# Patient Record
Sex: MALE | Employment: FULL TIME | URBAN - METROPOLITAN AREA
[De-identification: names, ages, dates, MRNs, and addresses within clinical notes are randomized per-mention and may not be internally consistent; named-entity substitution may affect disease eponyms.]

---

## 2019-04-10 ENCOUNTER — APPOINTMENT (OUTPATIENT)
Dept: CT IMAGING | Age: 39
End: 2019-04-10
Attending: EMERGENCY MEDICINE
Payer: COMMERCIAL

## 2019-04-10 ENCOUNTER — HOSPITAL ENCOUNTER (EMERGENCY)
Age: 39
Discharge: HOME | End: 2019-04-11
Attending: EMERGENCY MEDICINE
Payer: COMMERCIAL

## 2019-04-10 VITALS
SYSTOLIC BLOOD PRESSURE: 159 MMHG | OXYGEN SATURATION: 99 % | HEIGHT: 72 IN | BODY MASS INDEX: 42.66 KG/M2 | WEIGHT: 315 LBS | DIASTOLIC BLOOD PRESSURE: 78 MMHG | TEMPERATURE: 98 F | HEART RATE: 99 BPM | RESPIRATION RATE: 16 BRPM

## 2019-04-10 DIAGNOSIS — R51.9 ACUTE NONINTRACTABLE HEADACHE, UNSPECIFIED HEADACHE TYPE: Primary | ICD-10-CM

## 2019-04-10 LAB
ANION GAP SERPL CALC-SCNC: 6 MMOL/L (ref 3–18)
BASOPHILS # BLD: 0.1 K/UL (ref 0–0.1)
BASOPHILS NFR BLD: 1 % (ref 0–2)
BUN SERPL-MCNC: 21 MG/DL (ref 7–18)
BUN/CREAT SERPL: 18 (ref 12–20)
CALCIUM SERPL-MCNC: 8.6 MG/DL (ref 8.5–10.1)
CHLORIDE SERPL-SCNC: 104 MMOL/L (ref 100–108)
CO2 SERPL-SCNC: 28 MMOL/L (ref 21–32)
CREAT SERPL-MCNC: 1.2 MG/DL (ref 0.6–1.3)
DIFFERENTIAL METHOD BLD: ABNORMAL
EOSINOPHIL # BLD: 0.2 K/UL (ref 0–0.4)
EOSINOPHIL NFR BLD: 2 % (ref 0–5)
ERYTHROCYTE [DISTWIDTH] IN BLOOD BY AUTOMATED COUNT: 13.7 % (ref 11.6–14.5)
GLUCOSE SERPL-MCNC: 92 MG/DL (ref 74–99)
HCT VFR BLD AUTO: 47.9 % (ref 36–48)
HGB BLD-MCNC: 16.3 G/DL (ref 13–16)
LYMPHOCYTES # BLD: 3.7 K/UL (ref 0.9–3.6)
LYMPHOCYTES NFR BLD: 35 % (ref 21–52)
MAGNESIUM SERPL-MCNC: 2.3 MG/DL (ref 1.6–2.6)
MCH RBC QN AUTO: 29.2 PG (ref 24–34)
MCHC RBC AUTO-ENTMCNC: 34 G/DL (ref 31–37)
MCV RBC AUTO: 85.8 FL (ref 74–97)
MONOCYTES # BLD: 1 K/UL (ref 0.05–1.2)
MONOCYTES NFR BLD: 9 % (ref 3–10)
NEUTS SEG # BLD: 5.6 K/UL (ref 1.8–8)
NEUTS SEG NFR BLD: 53 % (ref 40–73)
PLATELET # BLD AUTO: 242 K/UL (ref 135–420)
PMV BLD AUTO: 10 FL (ref 9.2–11.8)
POTASSIUM SERPL-SCNC: 3.9 MMOL/L (ref 3.5–5.5)
RBC # BLD AUTO: 5.58 M/UL (ref 4.7–5.5)
SODIUM SERPL-SCNC: 138 MMOL/L (ref 136–145)
WBC # BLD AUTO: 10.4 K/UL (ref 4.6–13.2)

## 2019-04-10 PROCEDURE — 96360 HYDRATION IV INFUSION INIT: CPT

## 2019-04-10 PROCEDURE — 99285 EMERGENCY DEPT VISIT HI MDM: CPT

## 2019-04-10 PROCEDURE — 85025 COMPLETE CBC W/AUTO DIFF WBC: CPT

## 2019-04-10 PROCEDURE — 74011250636 HC RX REV CODE- 250/636: Performed by: EMERGENCY MEDICINE

## 2019-04-10 PROCEDURE — 70450 CT HEAD/BRAIN W/O DYE: CPT

## 2019-04-10 PROCEDURE — 93005 ELECTROCARDIOGRAM TRACING: CPT

## 2019-04-10 PROCEDURE — 96361 HYDRATE IV INFUSION ADD-ON: CPT

## 2019-04-10 PROCEDURE — 83735 ASSAY OF MAGNESIUM: CPT

## 2019-04-10 PROCEDURE — 80048 BASIC METABOLIC PNL TOTAL CA: CPT

## 2019-04-10 RX ORDER — TESTOSTERONE 50 MG/5G
5 GEL TRANSDERMAL DAILY
COMMUNITY
End: 2022-03-06

## 2019-04-10 RX ORDER — ACETAMINOPHEN 500 MG
1000 TABLET ORAL
Status: DISCONTINUED | OUTPATIENT
Start: 2019-04-10 | End: 2019-04-11 | Stop reason: HOSPADM

## 2019-04-10 RX ORDER — BUTALBITAL, ACETAMINOPHEN AND CAFFEINE 300; 40; 50 MG/1; MG/1; MG/1
1 CAPSULE ORAL
Qty: 20 CAP | Refills: 0 | Status: SHIPPED | OUTPATIENT
Start: 2019-04-10 | End: 2022-09-09

## 2019-04-10 RX ORDER — LISINOPRIL 40 MG/1
40 TABLET ORAL DAILY
COMMUNITY
End: 2022-09-09

## 2019-04-10 RX ORDER — HYDROCHLOROTHIAZIDE 12.5 MG/1
12.5 TABLET ORAL DAILY
COMMUNITY
End: 2022-09-09

## 2019-04-10 RX ORDER — DEXTROAMPHETAMINE SACCHARATE, AMPHETAMINE ASPARTATE, DEXTROAMPHETAMINE SULFATE AND AMPHETAMINE SULFATE 1.25; 1.25; 1.25; 1.25 MG/1; MG/1; MG/1; MG/1
5 TABLET ORAL
COMMUNITY
End: 2022-09-09

## 2019-04-10 RX ADMIN — SODIUM CHLORIDE 500 ML: 900 INJECTION, SOLUTION INTRAVENOUS at 21:38

## 2019-04-11 NOTE — DISCHARGE INSTRUCTIONS

## 2019-04-11 NOTE — ED TRIAGE NOTES
Presented to ED to be evaluated for reported visual field disturbances and dizziness with onset this p.m. Patient reports difficulty with peripheral fields. Patient denies any known injury and/or pain. Patient A&Ox 4 denies any additional complaints at this time. Sepsis Screening completed    (  )Patient meets SIRS criteria. (x  )Patient does not meet SIRS criteria.       SIRS Criteria is achieved when two or more of the following are present   Temperature < 96.8°F (36°C) or > 100.9°F (38.3°C)   Heart Rate > 90 beats per minute   Respiratory Rate > 20 breaths per minute   WBC count > 12,000 or <4,000 or > 10% bands

## 2019-05-01 NOTE — ED PROVIDER NOTES
EMERGENCY DEPARTMENT HISTORY AND PHYSICAL EXAM    Date: 4/10/2019  Patient Name: Rashida Davis    History of Presenting Illness     Chief Complaint   Patient presents with    Dizziness    Visual Field Change       History Provided By: Patient    Additional History (Context):   8:44 PM  Rashida Davis is a 45 y.o. male with PMHX of HTN, ADHD, PTSD, generalized anxiety disorder and medication for low testosterone who presents to the emergency department C/O visual field disturbances and dizziness, lightheadedness, not vertigo started a few hours before arrival. He reports peripheral blurriness in vision, but no scotoma or amaurosis. He has no HA, no syncope, no slurred speech, no arm or leg weakness. He denies CP/SOB, diaphoresis. Patient denies any known injury and/or pain. Patient A&Ox 4 denies any additional complaints at this time. PCP: Felix Harden MD    Current Outpatient Medications   Medication Sig Dispense Refill    lisinopril (PRINIVIL, ZESTRIL) 40 mg tablet Take 40 mg by mouth daily.  hydroCHLOROthiazide (HYDRODIURIL) 12.5 mg tablet Take 12.5 mg by mouth daily.  dextroamphetamine-amphetamine (ADDERALL) 5 mg tablet Take 5 mg by mouth.  testosterone (TESTIM) 50 mg/5 gram (1 %) gel 5 g by TransDERmal route daily. Indications: unknown dose. reports q 2 weeks.  butalbital-acetaminophen-caff (FIORICET) -40 mg per capsule Take 1 Cap by mouth every four (4) hours as needed for Pain. 20 Cap 0       Past History     Past Medical History:  History reviewed. No pertinent past medical history. Past Surgical History:  Past Surgical History:   Procedure Laterality Date    HX HEENT      HX ORTHOPAEDIC         Family History:  History reviewed. No pertinent family history.     Social History:  Social History     Tobacco Use    Smoking status: Never Smoker    Smokeless tobacco: Never Used   Substance Use Topics    Alcohol use: Never     Frequency: Never    Drug use: Never Allergies:  No Known Allergies      Review of Systems   Review of Systems   Constitutional: Positive for activity change and fatigue. HENT: Negative for congestion, ear discharge, ear pain, facial swelling, hearing loss, sinus pain and trouble swallowing. Eyes: Positive for visual disturbance. Negative for photophobia, pain, discharge, redness and itching. Respiratory: Negative for cough, chest tightness and shortness of breath. Cardiovascular: Negative for chest pain. Gastrointestinal: Negative for diarrhea, nausea and vomiting. Endocrine: Negative for cold intolerance and heat intolerance. Genitourinary: Negative for frequency, hematuria and urgency. Musculoskeletal: Negative for arthralgias and myalgias. Skin: Negative for color change. Neurological: Positive for dizziness and light-headedness. Negative for syncope, facial asymmetry, speech difficulty, numbness and headaches. No vertigo   Hematological: Does not bruise/bleed easily. All other systems reviewed and are negative. Physical Exam     Vitals:    04/10/19 2030   BP: 159/78   Pulse: 99   Resp: 16   Temp: 98 °F (36.7 °C)   SpO2: 99%   Weight: 144.7 kg (319 lb)   Height: 6' (1.829 m)     Physical Exam   Constitutional: He is oriented to person, place, and time. He appears well-developed and well-nourished. No distress. HENT:   Head: Normocephalic and atraumatic. Right Ear: External ear normal.   Left Ear: External ear normal.   Mouth/Throat: Oropharynx is clear and moist. No oropharyngeal exudate. Eyes: Pupils are equal, round, and reactive to light. Conjunctivae and EOM are normal. No scleral icterus. Right eye exhibits no nystagmus. Left eye exhibits no nystagmus. Fundoscopic exam:       The right eye shows no exudate, no hemorrhage and no papilledema. The left eye shows no exudate, no hemorrhage and no papilledema. Slit lamp exam:       The right eye shows no hyphema and no hypopyon.         The left eye shows no hyphema and no hypopyon. Visual fields full x 4 bilateral  No pallor   Neck: Normal range of motion. Neck supple. No JVD present. No tracheal deviation present. No thyromegaly present. Cardiovascular: Normal rate, regular rhythm and normal heart sounds. Pulmonary/Chest: Effort normal and breath sounds normal. No stridor. No respiratory distress. Abdominal: Soft. Bowel sounds are normal. He exhibits no distension. There is no tenderness. There is no rebound and no guarding. Musculoskeletal: Normal range of motion. He exhibits no edema or tenderness. No soft tissue injuries   Lymphadenopathy:     He has no cervical adenopathy. Neurological: He is alert and oriented to person, place, and time. He has normal strength and normal reflexes. No cranial nerve deficit. He displays a negative Romberg sign. Coordination and gait normal. GCS eye subscore is 4. GCS verbal subscore is 5. GCS motor subscore is 6.   no pronator drift, normal fine motor coordination, radial median ulnar nerves normal.   Skin: Skin is warm and dry. No rash noted. He is not diaphoretic. No erythema. Psychiatric: He has a normal mood and affect. His behavior is normal. Judgment and thought content normal.   Nursing note and vitals reviewed. Diagnostic Study Results     Labs -  Lab Results         Contains abnormal data CBC WITH AUTOMATED DIFF (Final result)    Component (Lab Inquiry)   Collection Time Result Time WBC RBC HGB HCT MCV MCH MCHC RDW PLT MPLV   04/10/19 20:20:00 04/10/19 20:51:40 10.4 5. 58High  16. 3High  47.9 85.8 29.2 34.0 13.7 242 10.0       Collection Time Result Time GRANS LYMPH MONOS EOS BASOS ABG ABL ABM BONNIE ABB   04/10/19 20:20:00 04/10/19 20:51:40 53 35 9 2 1 5.6 3.7High  1.0 0.2 0.1       Collection Time Result Time DF   04/10/19 20:20:00 04/10/19 20:51:40 AUTOMATED       Final result                        Contains abnormal data METABOLIC PANEL, BASIC (Final result)    Component (Lab Inquiry)   Collection Time Result Time NA K CL CO2 AGAP GLU BUN CREA BUCR GFRAA   04/10/19 20:20:00 04/10/19 20:59:56 138 3.9 104 28 6 92 21High  1.20 18 >60       Collection Time Result Time GFRNA CA   04/10/19 20:20:00 04/10/19 20:59:56 >60  (NOTE)   Estimated GFR . .. 8.6       Final result                        MAGNESIUM (Final result)    Component (Lab Inquiry)   Collection Time Result Time MG   04/10/19 20:20:00 04/10/19 21:32:22 2.3         Final result               Radiologic Studies -   CT HEAD WO CONT   Final Result   IMPRESSION:      No acute intracranial abnormalities. CT Results  (Last 48 hours)    None        CXR Results  (Last 48 hours)    None          Medications given in the ED-  Medications   sodium chloride 0.9 % bolus infusion 500 mL (0 mL IntraVENous IV Completed 4/11/19 0038)         Medical Decision Making   I am the first provider for this patient. I reviewed the vital signs, available nursing notes, past medical history, past surgical history, family history and social history. Vital Signs-Reviewed the patient's vital signs. Pulse Oximetry Analysis - 99% on room air    Cardiac Monitor:  Rate: 95 bpm  Rhythm: normal sinus rhythm    EKG interpretation: (Preliminary)  Normal sinus rhythm, rate 92, incomplete RBBB  EKG read by Cathryn Claros MD      Records Reviewed: NURSING NOTES AND PREVIOUS MEDICAL RECORDS    Provider Notes (Medical Decision Making): Symptoms strongly suggest dizziness and weakness from a non-neurovascular source EKG encouraging unlikely to be underlying arrhythmia no signs of retina pathology unlikely to be stroke or vertebral basilar insufficiency CAT scan unremarkable for stroke, tumour  Or bleed.   As do not feel he needs MRI though he does not reveal any psychiatric history chart record shows extensive visits for psychiatric issues adjustment disorder the symptoms are low on differential however support likelihood this is not a cardiovascular or neurovascular event  Procedures:  Procedures    ED Course:   8:44 PM: Initial assessment performed. The patients presenting problems have been discussed, and they are in agreement with the care plan formulated and outlined with them. I have encouraged them to ask questions as they arise throughout their visit. Diagnosis and Disposition       DISCHARGE NOTE:  11:29 PM  David Linton's  results have been reviewed with him. He has been counseled regarding his diagnosis, treatment, and plan. He verbally conveys understanding and agreement of the signs, symptoms, diagnosis, treatment and prognosis and additionally agrees to follow up as discussed. He also agrees with the care-plan and conveys that all of his questions have been answered. I have also provided discharge instructions for him that include: educational information regarding their diagnosis and treatment, and list of reasons why they would want to return to the ED prior to their follow-up appointment, should his condition change. He has been provided with education for proper emergency department utilization. CLINICAL IMPRESSION:    1. Acute nonintractable headache, unspecified headache type        PLAN:  1. D/C Home  2. Discharge Medication List as of 4/10/2019 11:59 PM      START taking these medications    Details   butalbital-acetaminophen-caff (FIORICET) -40 mg per capsule Take 1 Cap by mouth every four (4) hours as needed for Pain., Normal, Disp-20 Cap, R-0         CONTINUE these medications which have NOT CHANGED    Details   lisinopril (PRINIVIL, ZESTRIL) 40 mg tablet Take 40 mg by mouth daily. , Historical Med      hydroCHLOROthiazide (HYDRODIURIL) 12.5 mg tablet Take 12.5 mg by mouth daily. , Historical Med      dextroamphetamine-amphetamine (ADDERALL) 5 mg tablet Take 5 mg by mouth., Historical Med      testosterone (TESTIM) 50 mg/5 gram (1 %) gel 5 g by TransDERmal route daily. Indications: unknown dose. reports q 2 weeks. , Historical Med           3. Follow-up Information     Follow up With Specialties Details Why Contact Info    Zayda Hernandez MD [facility] In 3 weeks  [address]      Zayda Hernandez, [address] [facility]   [address] [address] [phone]      [facility] EMERGENCY DEPT Emergency Medicine  As needed, If symptoms worsen [address] [place] [id number]  [phone]        _______________________________    This note was partially transcribed via voice recognition software. Although efforts have been made to catch any discrepancies, it may contain sound alike words, grammatical errors, or nonsensical words.

## 2019-05-14 LAB
ATRIAL RATE: 92 BPM
CALCULATED P AXIS, ECG09: 58 DEGREES
CALCULATED R AXIS, ECG10: -21 DEGREES
CALCULATED T AXIS, ECG11: 43 DEGREES
DIAGNOSIS, 93000: NORMAL
P-R INTERVAL, ECG05: 160 MS
Q-T INTERVAL, ECG07: 356 MS
QRS DURATION, ECG06: 110 MS
QTC CALCULATION (BEZET), ECG08: 440 MS
VENTRICULAR RATE, ECG03: 92 BPM

## 2020-08-10 ENCOUNTER — HOSPITAL ENCOUNTER (OUTPATIENT)
Dept: GENERAL RADIOLOGY | Age: 40
Discharge: HOME | End: 2020-08-10
Attending: OTOLARYNGOLOGY
Payer: COMMERCIAL

## 2020-08-10 DIAGNOSIS — R13.10 DYSPHAGIA: ICD-10-CM

## 2020-08-10 PROCEDURE — 74011000250 HC RX REV CODE- 250: Performed by: OTOLARYNGOLOGY

## 2020-08-10 PROCEDURE — 74011000255 HC RX REV CODE- 255: Performed by: OTOLARYNGOLOGY

## 2020-08-10 PROCEDURE — 74220 X-RAY XM ESOPHAGUS 1CNTRST: CPT

## 2020-08-10 RX ADMIN — BARIUM SULFATE 135 ML: 980 POWDER, FOR SUSPENSION ORAL at 10:23

## 2020-08-10 RX ADMIN — BARIUM SULFATE 176 G: 960 POWDER, FOR SUSPENSION ORAL at 10:24

## 2020-08-10 RX ADMIN — ANTACID/ANTIFLATULENT 4 G: 380; 550; 10; 10 GRANULE, EFFERVESCENT ORAL at 10:23

## 2022-02-02 PROBLEM — R79.89 LOW TESTOSTERONE IN MALE: Status: ACTIVE | Noted: 2022-02-02

## 2022-05-02 ENCOUNTER — NEW PATIENT (OUTPATIENT)
Dept: RURAL CLINIC 1 | Facility: CLINIC | Age: 42
End: 2022-05-02

## 2022-05-02 DIAGNOSIS — H52.223: ICD-10-CM

## 2022-05-02 PROCEDURE — 92004 COMPRE OPH EXAM NEW PT 1/>: CPT

## 2022-05-02 PROCEDURE — 92015 DETERMINE REFRACTIVE STATE: CPT

## 2022-05-02 ASSESSMENT — VISUAL ACUITY
OU_SC: 20/25
OS_SC: 20/30
OU_SC: 20/30
OD_SC: 20/30
OD_SC: 20/30
OS_SC: 20/30

## 2022-05-02 ASSESSMENT — TONOMETRY
OD_IOP_MMHG: 15
OS_IOP_MMHG: 15